# Patient Record
Sex: FEMALE | Race: WHITE | NOT HISPANIC OR LATINO | Employment: OTHER | URBAN - METROPOLITAN AREA
[De-identification: names, ages, dates, MRNs, and addresses within clinical notes are randomized per-mention and may not be internally consistent; named-entity substitution may affect disease eponyms.]

---

## 2018-02-07 ENCOUNTER — CLINICAL SUPPORT (OUTPATIENT)
Dept: SPEECH THERAPY | Facility: HOSPITAL | Age: 73
End: 2018-02-07
Attending: OTOLARYNGOLOGY
Payer: MEDICARE

## 2018-02-07 ENCOUNTER — OFFICE VISIT (OUTPATIENT)
Dept: OTOLARYNGOLOGY | Facility: CLINIC | Age: 73
End: 2018-02-07
Payer: MEDICARE

## 2018-02-07 VITALS
WEIGHT: 170.19 LBS | SYSTOLIC BLOOD PRESSURE: 139 MMHG | TEMPERATURE: 98 F | DIASTOLIC BLOOD PRESSURE: 96 MMHG | HEART RATE: 102 BPM

## 2018-02-07 DIAGNOSIS — R49.9 VOICE DISTURBANCE: ICD-10-CM

## 2018-02-07 DIAGNOSIS — R49.9 VOICE DISTURBANCE: Primary | ICD-10-CM

## 2018-02-07 DIAGNOSIS — J38.01 UNILATERAL COMPLETE VOCAL FOLD PARALYSIS: ICD-10-CM

## 2018-02-07 PROCEDURE — 31579 LARYNGOSCOPY TELESCOPIC: CPT | Mod: S$PBB,,, | Performed by: OTOLARYNGOLOGY

## 2018-02-07 PROCEDURE — 99204 OFFICE O/P NEW MOD 45 MIN: CPT | Mod: 25,S$PBB,, | Performed by: OTOLARYNGOLOGY

## 2018-02-07 PROCEDURE — 1126F AMNT PAIN NOTED NONE PRSNT: CPT | Mod: ,,, | Performed by: OTOLARYNGOLOGY

## 2018-02-07 PROCEDURE — 99203 OFFICE O/P NEW LOW 30 MIN: CPT | Mod: PBBFAC,25 | Performed by: OTOLARYNGOLOGY

## 2018-02-07 PROCEDURE — 1159F MED LIST DOCD IN RCRD: CPT | Mod: ,,, | Performed by: OTOLARYNGOLOGY

## 2018-02-07 PROCEDURE — G9171 VOICE CURRENT STATUS: HCPCS | Mod: GN,CJ | Performed by: SPEECH-LANGUAGE PATHOLOGIST

## 2018-02-07 PROCEDURE — 92524 BEHAVRAL QUALIT ANALYS VOICE: CPT | Mod: GN | Performed by: SPEECH-LANGUAGE PATHOLOGIST

## 2018-02-07 PROCEDURE — 31579 LARYNGOSCOPY TELESCOPIC: CPT | Mod: PBBFAC | Performed by: OTOLARYNGOLOGY

## 2018-02-07 PROCEDURE — 99999 PR PBB SHADOW E&M-NEW PATIENT-LVL III: CPT | Mod: PBBFAC,,, | Performed by: OTOLARYNGOLOGY

## 2018-02-07 PROCEDURE — G9172 VOICE GOAL STATUS: HCPCS | Mod: GN,CI | Performed by: SPEECH-LANGUAGE PATHOLOGIST

## 2018-02-07 RX ORDER — LISINOPRIL 20 MG/1
20 TABLET ORAL DAILY
COMMUNITY

## 2018-02-07 RX ORDER — FLUTICASONE PROPIONATE 50 MCG
1 SPRAY, SUSPENSION (ML) NASAL
COMMUNITY

## 2018-02-07 RX ORDER — PENICILLIN V POTASSIUM 500 MG/1
500 TABLET, FILM COATED ORAL 4 TIMES DAILY
COMMUNITY

## 2018-02-07 RX ORDER — NAPROXEN SODIUM 220 MG/1
81 TABLET, FILM COATED ORAL DAILY
COMMUNITY

## 2018-02-07 RX ORDER — GUAIFENESIN AND PHENYLEPHRINE HCL 400; 10 MG/1; MG/1
500 TABLET ORAL DAILY
COMMUNITY

## 2018-02-07 RX ORDER — LEVOTHYROXINE SODIUM 112 UG/1
112 TABLET ORAL DAILY
COMMUNITY

## 2018-02-07 NOTE — LETTER
February 9, 2018      Georgette Samson MD  [address] [address]           [facility]  [address] [facility] [address]  [facility] [address]  Phone: [phone]  Fax: [phone]          Patient: Aruna Hart   MR Number: [id number]   YOB: 1945   Date of Visit: 2/7/2018       Dear Dr. Georgette Samson:    Thank you for referring Aruna Hart to me for evaluation. Attached you will find relevant portions of my assessment and plan of care.    If you have questions, please do not hesitate to call me. I look forward to following Aruna Hart along with you.    Sincerely,    Mart Agosto MD    Enclosure  CC:  No Recipients    If you would like to receive this communication electronically, please contact [email or web address] or [phone] to request more information on [employer] access.    For providers and/or their staff who would like to refer a patient to [facility], please contact us through our one-stop-shop provider referral line, [facility], at [phone].    If you feel you have received this communication in error or would no longer like to receive these types of communications, please e-mail [email or web address]

## 2018-02-07 NOTE — PATIENT INSTRUCTIONS
Consider your procedural options  1. Vocal fold injection augmentation  2. Laryngeal framework surgery; comes in 3 parts (if necessary): a) medialization laryngoplasty b) arytenoid repositioning procedure c) cricothyroid subluxation/approximation          WHAT TO EXPECT FROM VOICE THERAPY    Purpose  The purpose of voice therapy is to help you find a better, more efficient way to use your voice or to reduce symptoms such as coughing, throat clearing, or difficulty breathing.  Depending on your symptoms, you may learn how to produce clearer voice quality, how to reduce fatigue or pain associated with speaking, how to take care of your voice, and how to eliminate chronic coughing or throat clearing.      Process: Evaluation  First, you may go through some initial testing.  In most cases, a videostroboscopy will be performed in order to allow your speech pathologist and your physician to look at your vocal cords to aid in deciding if you would benefit from voice therapy.  Next, you may work with the speech pathologist to assess the current capabilities of your voice.  Following evaluation, your speech pathologist will design a therapeutic plan to improve your voice as well as other symptoms that may bother you.  At the time of evaluation, your speech pathologist may provide you with exercises to try at home.      Process: Therapy  Most patients benefit from 2-8 sessions over 1-3 months.  Voice therapy involves changing the behavior of your vocal cords and speaking habits, so it is very important that you attend your appointments and do home practices as instructed by your speech pathologist.  Home practice and participation in therapy are critical to meeting your desired voice goals, so of course, we are able to work with you to schedule appointments that are convenient for you.

## 2018-02-07 NOTE — PROGRESS NOTES
"Referring provider: Dr. Mart Agosto  Reason for visit:  Behavioral and qualitative analysis of voice and resonance (CPT 26193)    Subjective / History    Aruna Hart is a 72 y.o. female referred for voice evaluation (CPT 40749) by Dr. Agosto.  She presents with complaints of persistent voice change which began initially in  around her parathyroid surgeries, but then resolved, and then began again following her thyroidectomy in 2016.  Since that surgery, it has not returned to normal. She had a vocal fold injection with Dr. Samson ([place]) and then went to a few sessions of speech therapy; she feels that the injection and voice therapy may have helped a little bit.  She reports that voice is worse in the morning and at night.  She reports that sometimes her voice is a little bit better after eating.  She describes her voice as "raspy."  She has difficulty projecting her voice; she can't call her dogs without straining.  She reports feeling out of breath when talking.  Onset was sudden. Duration is about 1.5 years, since her total thyroidectomy.  She describes that sometimes, very occasionally, her voice sounds almost normal, but it doesn't last for long.     Breathing: conversational dyspnea    Smokin packs/day     Stroboscopy findings per Dr. Agosto on 18:  - right vocal fold immobile, paramedian, flaccid  - glottal insufficiency across all frequencies  - small posterior gap; mild but consistent vertical height mismatch with left vocal process higher  - primarily aperiodic vibration, with asymmetric amplitude of mucosal wave  - at lower pitches, intermittent periodic vibration; ongoing glottic insufficiency yet with symmetry of vibration  - compensatory supraglottic hyperfunction       No past medical history on file.  Current Outpatient Prescriptions on File Prior to Visit   Medication Sig Dispense Refill    alendronate-vitamin D3 (FOSAMAX PLUS D) 70 mg- 5,600 unit per tablet Take 1 " tablet by mouth every 7 days.      aspirin 81 MG Chew Take 81 mg by mouth once daily.      fluticasone (FLONASE) 50 mcg/actuation nasal spray 1 spray by Each Nare route daily 2 hours after breakfast.      levothyroxine (SYNTHROID) 112 MCG tablet Take 112 mcg by mouth once daily.      lisinopril (PRINIVIL,ZESTRIL) 20 MG tablet Take 20 mg by mouth once daily.      penicillin v potassium (VEETID) 500 MG tablet Take 500 mg by mouth 4 (four) times daily.      potassium phosphate, monobasic, (K-PHOS) 500 mg TbSO Take 500 mg by mouth once daily.      turmeric root extract 500 mg Cap Take 500 mg by mouth once daily.       No current facility-administered medications on file prior to visit.        Objective    Perceptual assessment:    -CAPE-V Overall Score: 46  -Quality: strained, low breath support, instability  -Volume: decreased projection  -Pitch: variable  -Flexibility: diminished  -Habitual respiratory pattern: chest/clavicular.    VHI-10 (completed to assess self-perceived handicap associated with dysphonia; >11 considered abnormal): 17   RSI (completed to assess the possible presence and/or severity of LPR symptoms and any relationship between this condition and the pt's dysphagia; score of ~15 may indicate LPR): 14    Education / Stimulability Trials  Discussed importance of vocal hygiene including: hydration.  Patient was stimulable for improved voice using semi-occluded vocal tract exercises and resonant voice targets.  Instructed patient on cup bubble phonation and resonant /m/ phonation in order to achieve vocal stability.  Once stability was achieved pt was able to carryover to words and phrases with variable success; this had improved by the end of session.  She was able to monitor her own voice for breaks and was able to improve her voice in moments of instability.  Encouraged regular practice to maintain improved, stable voice.      Functional goals  Length Status Goal   Long term Initiated  Patient  and clinician will facilitate changes in vocal function in order to restore functional use of voice for daily occupational, social, and emotional demands.    Long term Initiated  Patient will re-establish phonation with adequate balance of airflow and resonance with decreased muscle tension.    Long term Initiated   Patient will improve coordination of respiration and phonation for efficient vocal production at a conversational level.    Short term Initiated  Patient will complete SOVT exercises and/or resonant-focused exercises 3-5x daily to strengthen and balance the intrinsic laryngeal musculature and maximize glottic closure without medial hyperfunction.   Short term Initiated  Patient will improve the quality, efficiency, and ease of phonation through resonant and/or airflow exercises from the syllable to conversation level with 80% accuracy.   Short term Initiated  Patient will discriminate between easy and strained phonation with 80% accuracy.        Assessment     Ms. Aruna Hart presents with moderate dysphonia secondary to unilateral vocal fold paralysis as diagnosed by Dr. Agosto.  Prognosis for continued improvement is fair.     Recommendations / POC    -Recommend 1-2 sessions of voice therapy over 4-8 weeks with a speech-language pathologist to optimize glottal postures for improved vocal function, vocal efficiency, and ease of phonation  -Continue exercises as discussed in session  -Contact clinician with any further questions     G-Codes for Voice  Current status:   - CJ   Projected status:  - CI

## 2018-02-07 NOTE — PROGRESS NOTES
[facility]  Department of Otorhinolaryngology and Communication Sciences    Aruna Hart is a 72 y.o. female who presents to the [facility] for consultation at the kind request of Dr. Georgette Samson for further management of a laryngeal abnormality.     She complains of voice change following endrocrine neck surgery. She has a history of 2 parathyroidectomies, followed by a thyroidectomy in the Fall of 2016.  Her voice changed slightly after the first two surgeries, but has not come back to normal since the thyroidecomy.  Voice is worse in the morning and at night.  She underwent vocal fold injection augmentation with Dr. Samson in 4/2017 and then went to a few sessions of speech therapy afterwards.  She reports that her voice improved for about a week or so after injection, but then quickly deteriorated to pre-injection levels. She feels that the speech therapy may have helped a little bit.  She assures me that her voice is now stable. She describes her voice as raspy.  She has difficulty projecting her voice; she can't call her dogs without straining.  She reports feeling out of breath when talking.      Dr. Samson recently re-evaluated her and sent her to me for consideration of her candidacy for laryngeal framework surgery, suspecting that an arytenoid procedure may be beneficial.    Voice Handicap Index-10 (VHI-10) score is 17.   Reflux Symptom Index (RSI) score is 14.   Eating Assessment Tool-10 (EAT-10) score is 0.   Dyspnea Index (DI) score is 0.  Cough Severity Index (CSI) score is 0.    Past Medical History  HTN  Allergies    Past Surgical History  Spinal fusion (lumbar): 3/2012  Eyelid  Bunionectomy  Hysterectomy  Tubal ligation  Tonsillectomy: 1973  Parathyroid: 8/2015 L, 11/2015 R   Thyroidectomy: 10/2016    Family History  Mother: HTN, pacemaker, colon cancer, thyroid medication, diabetic  Father: arthritis  Brother: thyroid, HTN, anxiety  Brother: HTN, diabetes, ?kidney cancer  Sister:  breast pre-cancer    Social History  She reports that she has quit smoking. She has never used smokeless tobacco.    Allergies  She is allergic to sudafed [pseudoephedrine hcl].    Medications  She has a current medication list which includes the following prescription(s): alendronate-vitamin d3, aspirin, fluticasone, levothyroxine, lisinopril, penicillin v potassium, potassium phosphate (monobasic), and turmeric root extract.    Review of Systems   Constitutional: Negative for fever.   HENT: Negative for sore throat.    Eyes: Positive for visual disturbance.   Respiratory: Negative for wheezing.    Cardiovascular: Negative for chest pain.   Gastrointestinal: Negative for nausea.   Musculoskeletal: Positive for arthralgias.   Skin: Negative for rash.   Neurological: Negative for tremors.   Hematological: Does not bruise/bleed easily.   Psychiatric/Behavioral: The patient is not nervous/anxious.           Objective:     VS reviewed     Physical Exam    Constitutional: comfortable, well dressed  Psychiatric: appropriate affect  Respiratory: comfortably breathing, symmetric chest rise, no stridor  Voice: unstable resonance; improved resonance at low pitches; at modal and high pitches, variable mild-moderate roughness, strain, breathiness; primarily pulse register phonation; truncated phonation time; intermittent diplophonia; impaired projection  Cardiovascular: upper extremities non-edematous  Lymphatic: no cervical lymphadenopathy  Neurologic: alert and oriented to time, place, person, and situation; cranial nerves 3-12 grossly intact  Head: normocephalic  Eyes: conjunctivae and sclerae clear  Ears: normal pinnae, normal external auditory canals, tympanic membranes intact  Nose: mucosa pink and noncongested, no masses, no mucopurulence, no polyps  Oral cavity / oropharynx: no mucosal lesions  Neck: soft, full range of motion, laryngotracheal complex palpable with appropriate landmarks, larynx elevates on  swallowing  Indirect laryngoscopy: limited due to gag    Procedure  Rigid Laryngeal Videostroboscopy (07731): Laryngeal videostroboscopy is indicated to assess the laryngeal vibratory biomechanics and vocal fold oscillation, which cannot be assessed with a plain light examination. This was carried out with a 70 degree endoscope. After verbal consent was obtained, the patient was positioned and the tongue was gently secured with a gauze sponge. The endoscope was passed transorally and positioned to image the larynx and hypopharynx in detail. The following features were examined: laryngeal and hypopharyngeal masses; vocal fold range and symmetry of motion; laryngeal mucosal edema, erythema, inflammation, and hydration; salivary pooling; and gross laryngeal sensation. During phonation, the vocal folds were assessed for glottal closure; mucosal wave; vocal fold lesions; vibratory periodicity, amplitude, and phase symmetry; and vertical height match. The equipment was removed. The patient tolerated the procedure well without complication. All findings were normal except:  - right vocal fold immobile, paramedian, flaccid  - glottal insufficiency across all frequencies  - small posterior gap; mild but consistent vertical height mismatch with left vocal process higher  - primarily aperiodic vibration, with asymmetric amplitude of mucosal wave  - at lower pitches, intermittent periodic vibration; ongoing glottic insufficiency yet with symmetry of vibration  - compensatory supraglottic hyperfunction      Assessment:     Aruna Hart is a 72 y.o. female with chronic right vocal fold paralysis following thyroidectomy in Fall 2016 (as well as a history of temporary dysphonia following 2 prior parathyroid surgeries). I note ongoing glottal insufficiency, with some mild glottic vertical height mismatch and denervation hypotonia in her paralyzed vocal fold.       Plan:        I had a discussion with the patient and her   regarding her condition and the further workup and management options.      As her voice is presently quite serviceable despite its limitations, she may derive benefit from additional SLP voice evaluation and voice therapy. In fact she underwent this evaluation and treatment in conjunction with this visit. This did help her find ways to achieve more vocal stability, but ongoing dysphonia was noted due to her glottal insufficiency. Please see SLP report for full details.    At this point, I would not expect her to recover function of her vocal fold. As such, treatment options with which I presented her included either repeating the injection augmentation, potentially with a long-lasting injectable, or proceeding to the OR for larynegal framework surgery.     The risks and benefits of vocal fold injection augmentation were discussed with the patient. Risks included but were not limited to bleeding, infection, allergic reaction to the injectable, scarring, worsening of voice, early resorption, need for additional procedures, pain, epistaxis, and airway edema which could necessitate need for intubation or tracheostomy. We informed the patient that while in the past this procedure was performed mainly in the operating room under general anesthesia, we now primarily perform this procedure in our procedure suite without the need for general anesthesia. Because she derived modest benefit, at best, from this type of intervention in the past, and due to the 3-dimensional abnormalities I note on stroboscopy, I would expect injection augmentation to provide limited success in comparison to laryngeal framework surgery. Further, with injection augmentation, even with calcium hydroxyapatite, she should anticipate eventual deterioration in her voice which may necessitate additional intervention.    She would alternatively benefit from right laryngeal framework surgery - medialization laryngoplasty, possible arytenoid repositioning  procedure, possible cricothyroid procedure -  for the treatment of his condition. I would anticipate using a using a [place] implant, and in fact would have a low threshold to include the other 2 adjunct cartilage repositioning procedures. She understands that the extent of our surgery would be dictated by intraoperative findings. I discussed the risks, benefits and alternatives to surgery with the patient, as well as the expected postoperative course, with placement of a closed suction drain and overnight observation. Risks included but were not limited to pain; bleeding, hematoma; infection; reaction to the implant; scarring (skin, vocal fold); worsening of voice; implant migration and/or extrusion; need for additional and/or revision procedures; pharyngeal leak, fistula, mediastinitis; and airway obstruction which may necessitate tracheotomy. Also inherent in the procedure are the risks of anesthesia, including but not limited to cardiovascular complications (heart attack, arrhythmia); pulmonary (respiratory failure); neurologic (stroke); and death.    I gave her the opportunity to ask questions and I answered all of them. She will consider her options and will contact me in the future if she would like to proceed with any procedure-based treatment.    Mart Agosto M.D.  [facility]  Department of Otorhinolaryngology and Communication Sciences